# Patient Record
Sex: MALE | Race: AMERICAN INDIAN OR ALASKA NATIVE
[De-identification: names, ages, dates, MRNs, and addresses within clinical notes are randomized per-mention and may not be internally consistent; named-entity substitution may affect disease eponyms.]

---

## 2017-10-02 ENCOUNTER — HOSPITAL ENCOUNTER (OUTPATIENT)
Dept: HOSPITAL 5 - GIO | Age: 52
Discharge: HOME | End: 2017-10-02
Attending: INTERNAL MEDICINE
Payer: COMMERCIAL

## 2017-10-02 VITALS — SYSTOLIC BLOOD PRESSURE: 115 MMHG | DIASTOLIC BLOOD PRESSURE: 79 MMHG

## 2017-10-02 DIAGNOSIS — Z80.0: ICD-10-CM

## 2017-10-02 DIAGNOSIS — K63.5: ICD-10-CM

## 2017-10-02 DIAGNOSIS — Z12.11: Primary | ICD-10-CM

## 2017-10-02 DIAGNOSIS — K64.8: ICD-10-CM

## 2017-10-02 DIAGNOSIS — D12.8: ICD-10-CM

## 2017-10-02 DIAGNOSIS — Z98.890: ICD-10-CM

## 2017-10-02 DIAGNOSIS — Z72.89: ICD-10-CM

## 2017-10-02 DIAGNOSIS — F17.210: ICD-10-CM

## 2017-10-02 PROCEDURE — 45388 COLONOSCOPY W/ABLATION: CPT

## 2017-10-02 PROCEDURE — 88305 TISSUE EXAM BY PATHOLOGIST: CPT

## 2017-10-02 PROCEDURE — 45384 COLONOSCOPY W/LESION REMOVAL: CPT

## 2017-10-02 NOTE — DISCHARGE SUMMARY
Short Stay Discharge Plan


Activity: advance as tolerated


Weight Bearing Status: Weight Bear as Tolerated


Diet: regular

## 2017-10-02 NOTE — OPERATIVE REPORT
Operative Report


Operative Report: 


Date of procedure: 10/02/2017





Procedure: Colonoscopy with multiple hot biopsy polypectomies, multiple polyp 

ablations.





Attending physician: Dustin Sotelo MD


: Dustin Sotelo MD





Indication: Patient is a 52-year-old male who presents for colorectal cancer 

screening. A colonoscopy serves to evaluate patient for colorectal cancer 

screening.





Consent: Informed consent was obtained after advising the patient and family 

regarding nature of this procedure, its indications, potential benefits as well 

as possible complications including but not limited to bleeding perforation and 

adverse reaction to medication, infection as well as other cardiopulmonary 

complications.  An informed written and verbal consent was then obtained after 

due opportunity was provided for questions and answers.





Monitoring: Patient was monitored continuously with pulse oximetry and 

electrocardiographic recordings as well as blood pressure recordings.  Vital 

signs remained stable throughout this procedure with no untoward events.





Preoperative assessment: Patient was assessed immediately prior to this 

procedure for capacity to tolerate monitored anesthesia care and moderate 

sedation as well as general anesthesia.  Patient's ASA classification is 2, 

Mallampati class is 2, Hyomental distance is 3.





Instrument: Diagnostic Hybridsn videocolonoscope.


AnyPerkinon video endoscope.


Medications: Propofol given intravenously in divided doses.  For details please 

refer to anesthesia records.





Description of procedure: Patient was placed in the left lateral decubitus 

position after achieving sedation, a digital rectal examination was performed 

following which the colonoscope was introduced into the anal verge and advanced 

to the cecum which was identified by the cecal valve, the appendiceal orifice, 

as well as by the cecal strap and direct transillumination.  The colonoscope 

was subsequently withdrawn with careful inspection of all mucosal surfaces.  

Patient tolerated this procedure well and was subsequently taken to the 

recovery room.  The following findings were noted.





Findings: Patient had multiple diminutive flat polyps in the rectum which were 

ablated.  Also there were 4 additional diminutive polyps that were removed hot 

biopsy polypectomy.  Patient also had 2 polyps in the sigmoid colon that were 

removed by hot biopsy polypectomy.  There were 2 additional polyps in sigmoid 

colon that were ablated. The rest of the colon to the cecum was normal. On the 

retroflex view at the anal verge, patient had  internal hemorrhoids.  Patient 

tolerated the procedure well with no untoward events.





Impression: Multiple diminutive rectal polyps, status post hot biopsy 

polypectomy and polyp ablation


Diminutive sigmoid colon polyps status post hot biopsy polypectomy


Diminutive flat sigmoid colon polyps status post ablation


Internal hemorrhoids





Plan: Follow pathology report


High-fiber diet.


Repeat colonoscopy in 5 years

## 2017-10-02 NOTE — ANESTHESIA CONSULTATION
Anesthesia Consult and Med Hx


Date of service: 10/02/17





- Airway


Anesthetic Teeth Evaluation: Good (missing one lower and one lower loose)


ROM Head & Neck: Adequate


Mental/Hyoid Distance: Adequate


Mallampati Class: Class II


Intubation Access Assessment: Probably Good





- Pulmonary Exam


CTA: Yes





- Cardiac Exam


Cardiac Exam: RRR





- Pre-Operative Health Status


ASA Pre-Surgery Classification: ASA3


Proposed Anesthetic Plan: MAC





- Pulmonary


Hx Smoking: Yes (1 PPD)





- Other Systems


Hx Alcohol Use: Yes (2 beers a day during week, 6pack on weekend)





- Additional Comments


Anesthesia Medical History Comments: HEP C

## 2018-03-27 ENCOUNTER — HOSPITAL ENCOUNTER (EMERGENCY)
Dept: HOSPITAL 5 - ED | Age: 53
Discharge: HOME | End: 2018-03-27
Payer: COMMERCIAL

## 2018-03-27 VITALS — SYSTOLIC BLOOD PRESSURE: 118 MMHG | DIASTOLIC BLOOD PRESSURE: 80 MMHG

## 2018-03-27 DIAGNOSIS — F17.200: ICD-10-CM

## 2018-03-27 DIAGNOSIS — K61.0: Primary | ICD-10-CM

## 2018-03-27 LAB
BASOPHILS # (AUTO): 0.1 K/MM3 (ref 0–0.1)
BASOPHILS NFR BLD AUTO: 0.8 % (ref 0–1.8)
BILIRUB UR QL STRIP: (no result)
BLOOD UR QL VISUAL: (no result)
BUN SERPL-MCNC: 12 MG/DL (ref 9–20)
BUN/CREAT SERPL: 11 %
CALCIUM SERPL-MCNC: 9.4 MG/DL (ref 8.4–10.2)
EOSINOPHIL # BLD AUTO: 0.1 K/MM3 (ref 0–0.4)
EOSINOPHIL NFR BLD AUTO: 1.1 % (ref 0–4.3)
HCT VFR BLD CALC: 44.5 % (ref 35.5–45.6)
HEMOLYSIS INDEX: 23
HGB BLD-MCNC: 14.6 GM/DL (ref 11.8–15.2)
LYMPHOCYTES # BLD AUTO: 3.3 K/MM3 (ref 1.2–5.4)
LYMPHOCYTES NFR BLD AUTO: 31.8 % (ref 13.4–35)
MCH RBC QN AUTO: 28 PG (ref 28–32)
MCHC RBC AUTO-ENTMCNC: 33 % (ref 32–34)
MCV RBC AUTO: 84 FL (ref 84–94)
MONOCYTES # (AUTO): 1 K/MM3 (ref 0–0.8)
MONOCYTES % (AUTO): 9.8 % (ref 0–7.3)
MUCOUS THREADS #/AREA URNS HPF: (no result) /HPF
PH UR STRIP: 5 [PH] (ref 5–7)
PLATELET # BLD: 269 K/MM3 (ref 140–440)
PROT UR STRIP-MCNC: (no result) MG/DL
RBC # BLD AUTO: 5.27 M/MM3 (ref 3.65–5.03)
RBC #/AREA URNS HPF: 1 /HPF (ref 0–6)
UROBILINOGEN UR-MCNC: < 2 MG/DL (ref ?–2)
WBC #/AREA URNS HPF: < 1 /HPF (ref 0–6)

## 2018-03-27 PROCEDURE — 36415 COLL VENOUS BLD VENIPUNCTURE: CPT

## 2018-03-27 PROCEDURE — 85025 COMPLETE CBC W/AUTO DIFF WBC: CPT

## 2018-03-27 PROCEDURE — 96375 TX/PRO/DX INJ NEW DRUG ADDON: CPT

## 2018-03-27 PROCEDURE — 99284 EMERGENCY DEPT VISIT MOD MDM: CPT

## 2018-03-27 PROCEDURE — 80048 BASIC METABOLIC PNL TOTAL CA: CPT

## 2018-03-27 PROCEDURE — 81001 URINALYSIS AUTO W/SCOPE: CPT

## 2018-03-27 PROCEDURE — 72193 CT PELVIS W/DYE: CPT

## 2018-03-27 PROCEDURE — 96365 THER/PROPH/DIAG IV INF INIT: CPT

## 2018-03-27 PROCEDURE — 96368 THER/DIAG CONCURRENT INF: CPT

## 2018-03-27 NOTE — CAT SCAN REPORT
CT PELVIS WITH CONTRAST



History: Rectal pain, evaluate for perianal abscess.



Technique: Helical CT following IV contrast. Sagittal and coronal 

reformatted images.



Findings:

A 1.7 cm perianal abscess is identified in the right side. This is best 

demonstrated on the delayed images, series 4, image 49. No extension 

into the ischiorectal fat.



The bladder, distal ureters, prostate gland and visualized bowel loops 

in the pelvis are within normal limits. The vascular structures are 

patent. The bony pelvis is intact.



Impression:

1.7 cm right perianal abscess.

## 2018-03-27 NOTE — EMERGENCY DEPARTMENT REPORT
Abscess Boil HPI





- HPI


Chief Complaint: Rectal Pain


Stated Complaint: HEMORRHOIDS


Time Seen by Provider: 03/27/18 11:08


Duration: 2 Days


Location: Perianal


Severity: Moderate


History: Yes Pain, Yes Purulent Drainage, No Fever, No Numbness, No Foreign Body

, No Previous History, No Insect Bite


HPI: This is a 53-year-old male nontoxic, well nourished in appearance, no 

acute signs of distress presents to the ED with c/o of perianal pain x2 days. 

Patient denies any sexual intercourse in that area. Patient denies any rectum 

bleeding, fever, chills, nausea, vomiting, chest pain, abdominal pain, numbness

, tingling, headache, or stiff neck. Patient denies any allergies or PMH.


Home Medications: 


 Previous Rx's











 Medication  Instructions  Recorded  Last Taken  Type


 


Amoxicillin/K Clav Tab [Augmentin 1 tab PO Q12HR #20 tab 03/27/18 Unknown Rx





875 mg]    


 


HYDROcodone/ACETAMINOPHEN [Norco 1 each PO Q8H PRN #15 tablet 03/27/18 Unknown 

Rx





7.5-325 Tablet]    


 


Ibuprofen [Motrin] 800 mg PO Q8HR PRN #30 tablet 03/27/18 Unknown Rx











Allergies/Adverse Reactions: 


 Allergies











Allergy/AdvReac Type Severity Reaction Status Date / Time


 


No Known Allergies Allergy   Verified 03/27/18 10:44














ED Review of Systems


ROS: 


Stated complaint: HEMORRHOIDS


Other details as noted in HPI





Constitutional: denies: chills, fever


Eyes: denies: eye pain, eye discharge, vision change


ENT: denies: ear pain, throat pain


Respiratory: denies: cough, shortness of breath, wheezing


Cardiovascular: denies: chest pain, palpitations


Endocrine: no symptoms reported


Gastrointestinal: denies: abdominal pain, nausea, diarrhea


Genitourinary: denies: urgency, dysuria


Musculoskeletal: denies: back pain, joint swelling, arthralgia


Skin: denies: rash, lesions


Neurological: denies: headache, weakness, paresthesias


Psychiatric: denies: anxiety, depression


Hematological/Lymphatic: denies: easy bleeding, easy bruising





ED Past Medical Hx





- Past Medical History


Previous Medical History?: No





- Surgical History


Past Surgical History?: No





- Social History


Smoking Status: Current Every Day Smoker





- Medications


Home Medications: 


 Home Medications











 Medication  Instructions  Recorded  Confirmed  Last Taken  Type


 


Amoxicillin/K Clav Tab [Augmentin 1 tab PO Q12HR #20 tab 03/27/18  Unknown Rx





875 mg]     


 


HYDROcodone/ACETAMINOPHEN [Norco 1 each PO Q8H PRN #15 tablet 03/27/18  Unknown 

Rx





7.5-325 Tablet]     


 


Ibuprofen [Motrin] 800 mg PO Q8HR PRN #30 tablet 03/27/18  Unknown Rx














ED Abscess Boil Physical Exam





- Exam


General: 


Vital signs noted. No distress. Alert and acting appropriately.





GENERAL: The patient is a well-developed, well-nourished in no apparent 

distress. Patient is alert and acting appropriately for age.  Alert and 

oriented 3, no apparent distress, normal gait, atraumatic.





HEENT: Head is normocephalic and atraumatic. PERRL, Extraocular muscles are 

intact. Pupils are equal, round, and reactive to light and accommodation. Nares 

appeared normal. Mouth is well hydrated and without lesions. Mucous membranes 

are moist. Posterior pharynx clear of any exudate or lesions.  Mouth is well 

hydrated and without lesions.  Tonsils not erythematous or swollen.  Uvula 

midline.  Tongue elevated.  Mucous members are moist.  Posterior pharynx clear, 

no exudate or lesions.  Patent airways.


 


NECK: Supple. No carotid bruits. No lymphadenopathy or thyromegaly.nontender. 

No meningitic signs are noted.


 


LUNGS: Clear to auscultation. Non labor breathing. No intercostal retractions.  

Symmetrical with respiration, no wheezing, no rales, or crackles.


 


HEART: Regular rate and rhythm without murmur, rubs or gallops. No 

reproducible.  S1, S2 present, regular rate and rhythm without murmur, no rubs, 

no gallops.


 


ABDOMEN: Soft, nontender, and nondistended.  Positive bowel sounds.  No 

hepatosplenomegaly was noted. No guarding or rebound tenderness, negative 

epigastric bruit. Negative psoas sign, negative reich sign, negative McBurneys 

sign


 


EXTREMITIES: Without any cyanosis, clubbing, rash, lesions or edema. Peripheral 

pulses intact. Capillary refill less than 2 seconds.  Full range of motion 

bilaterally.


 


NEUROLOGIC: Cranial nerves II through XII are grossly intact. Alert and 

oriented x 3. Normal gait. Symmetrical strength and sensation. Reflexes 2+ 

throughout. Cerebellar testing normal. GCS score of 15.


 


PSYCHIATRIC: Normal affect with no suicidal or homicidal ideations.





Skin: No anal hemorrheads present. 2 cm swelling with induration and flutance 

noted in the perianal region. Tender to touch. No pus or drainage noted.


Size: 2 cm


Exam: Yes Tenderness, Yes Fluctuance, Yes Normal Neurologic Exam, Yes Normal 

Circulation, No Surrounding Cellulites/Erythema, No Lymphangitis, No Crepitation

, No Heart Murmur





ED Course


 Vital Signs











  03/27/18





  10:44


 


Temperature 98.3 F


 


Pulse Rate 82


 


Respiratory 16





Rate 


 


Blood Pressure 117/83


 


O2 Sat by Pulse 98





Oximetry 














- Reevaluation(s)


Reevaluation #1: 





03/27/18 12:09


Patient is speaking in full sentences with no signs of distress noted.





- Consultations


Consultation #1: 





03/27/18 12:09


Patient has been consulted with ALEXUS Jimenes about patient history, physical 

exam, and CT pending and agrees to ED plan of care and discharge plan of care. 


Consultation #2: 





03/27/18 13:52


Dr. Petersen was consulted about patient history, physical exam, and imaging 

results and stated to discharge with augmentin and f/u in 14 days.





Critical care attestation.: 


If time is entered above; I have spent that time in minutes in the direct care 

of this critically ill patient, excluding procedure time.








ED Medical Decision Making





- Lab Data


Result diagrams: 


 03/27/18 12:20





 03/27/18 12:20





- Medical Decision Making





This is a 53-year-old male that presents with perianal abscess. PAtient is 

stable and was examined by me. CT obtained and dictated by the radiologist with 

impression of 1.7 cm perianal abscess.  Patient is notified of the results with 

no questions noted by the patient. LAbs within normal limits. Patient received 

Levo and Flagyl IV in the ED. Dr. Puga was consulted and agrees to the ED 

plan of care. Dr. Petersen surgery was consulted about patient history, physical 

exam, and CT results and stated to discharge patient with augmentin and follow-

up in 14 days.  Patient was given strict precautions on observation of 

increasing swelling and if so to reports a disturbance was possible.  Patient 

was educated and instructed on applying warm compressors.  Patient was 

instructed not to operate any machinery after discharge due to drowsiness of 

Morphine that he received in the ED and patient stated he will have a family 

member drive the patient home.  Patient discharged with Augmentin. At time of 

discharge, the patient does not seem toxic or ill in appearance.  No acute 

signs of distress noted.  Patient agrees to discharge treatment plan of care.  

No further questions noted by the patient.





ED Disposition


Clinical Impression: 


 Perianal abscess





Disposition: DC-01 TO HOME OR SELFCARE


Is pt being admited?: No


Does the pt Need Aspirin: No


Condition: Stable


Instructions:  Acetaminophen/Codeine (By mouth), Amoxicillin/Clavulanate 

Potassium (By mouth), Anorectal Abscess and Anal Fistula (ED), Abscess (ED)


Additional Instructions: 


Follow-up with a surgery doctor in 3-5 days or if symptoms worsen and continue 

return to emergency room as soon as possible. 


Apply warm compressors to the area.





General Surgery Dr. Petersen


[phone]


Prescriptions: 


Amoxicillin/K Clav Tab [Augmentin 875 mg] 1 tab PO Q12HR #20 tab


HYDROcodone/ACETAMINOPHEN [Norco 7.5-325 Tablet] 1 each PO Q8H PRN #15 tablet


 PRN Reason: Pain


Ibuprofen [Motrin] 800 mg PO Q8HR PRN #30 tablet


 PRN Reason: Pain


Referrals: 


PRIMARY CARE,MD [Primary Care Provider] - 3-5 Days


KEY PETERSEN DO [Staff Physician] - 3-5 Days


[facility] [Outside] - 3-5 Days


[facility] [Outside] - 3-5 Days


Forms:  Work/School Release Form(ED)